# Patient Record
Sex: MALE | Race: AMERICAN INDIAN OR ALASKA NATIVE
[De-identification: names, ages, dates, MRNs, and addresses within clinical notes are randomized per-mention and may not be internally consistent; named-entity substitution may affect disease eponyms.]

---

## 2018-08-22 ENCOUNTER — HOSPITAL ENCOUNTER (OUTPATIENT)
Dept: HOSPITAL 5 - CATHLABREC | Age: 68
Discharge: HOME | End: 2018-08-22
Attending: RADIOLOGY
Payer: MEDICARE

## 2018-08-22 VITALS — DIASTOLIC BLOOD PRESSURE: 61 MMHG | SYSTOLIC BLOOD PRESSURE: 200 MMHG

## 2018-08-22 DIAGNOSIS — M06.9: ICD-10-CM
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
## 2018-08-22 DIAGNOSIS — Z98.42: ICD-10-CM

## 2018-08-22 DIAGNOSIS — T82.858A: Primary | ICD-10-CM

## 2018-08-22 DIAGNOSIS — Z87.891: ICD-10-CM

## 2018-08-22 DIAGNOSIS — E11.51: ICD-10-CM

## 2018-08-22 DIAGNOSIS — Z79.4: ICD-10-CM

## 2018-08-22 DIAGNOSIS — E11.42: ICD-10-CM

## 2018-08-22 DIAGNOSIS — Z79.82: ICD-10-CM

## 2018-08-22 DIAGNOSIS — I12.0: ICD-10-CM

## 2018-08-22 DIAGNOSIS — Z98.890: ICD-10-CM

## 2018-08-22 DIAGNOSIS — Z88.5: ICD-10-CM

## 2018-08-22 DIAGNOSIS — Z79.899: ICD-10-CM

## 2018-08-22 DIAGNOSIS — I25.2: ICD-10-CM

## 2018-08-22 DIAGNOSIS — E78.00: ICD-10-CM

## 2018-08-22 DIAGNOSIS — Z98.41: ICD-10-CM

## 2018-08-22 DIAGNOSIS — E11.22: ICD-10-CM

## 2018-08-22 DIAGNOSIS — N18.6: ICD-10-CM

## 2018-08-22 LAB
BASOPHILS # (AUTO): 0.1 K/MM3 (ref 0–0.1)
BASOPHILS NFR BLD AUTO: 0.7 % (ref 0–1.8)
BUN SERPL-MCNC: 22 MG/DL (ref 9–20)
BUN/CREAT SERPL: 6 %
CALCIUM SERPL-MCNC: 9.2 MG/DL (ref 8.4–10.2)
EOSINOPHIL # BLD AUTO: 0.3 K/MM3 (ref 0–0.4)
EOSINOPHIL NFR BLD AUTO: 3 % (ref 0–4.3)
HCT VFR BLD CALC: 33.7 % (ref 35.5–45.6)
HEMOLYSIS INDEX: 0
HGB BLD-MCNC: 11.4 GM/DL (ref 11.8–15.2)
LYMPHOCYTES # BLD AUTO: 1.6 K/MM3 (ref 1.2–5.4)
LYMPHOCYTES NFR BLD AUTO: 15.7 % (ref 13.4–35)
MCH RBC QN AUTO: 28 PG (ref 28–32)
MCHC RBC AUTO-ENTMCNC: 34 % (ref 32–34)
MCV RBC AUTO: 84 FL (ref 84–94)
MONOCYTES # (AUTO): 0.8 K/MM3 (ref 0–0.8)
MONOCYTES % (AUTO): 7.5 % (ref 0–7.3)
PLATELET # BLD: 171 K/MM3 (ref 140–440)
RBC # BLD AUTO: 4.03 M/MM3 (ref 3.65–5.03)

## 2018-08-22 PROCEDURE — C1769 GUIDE WIRE: HCPCS

## 2018-08-22 PROCEDURE — 85025 COMPLETE CBC W/AUTO DIFF WBC: CPT

## 2018-08-22 PROCEDURE — C1751 CATH, INF, PER/CENT/MIDLINE: HCPCS

## 2018-08-22 PROCEDURE — C1894 INTRO/SHEATH, NON-LASER: HCPCS

## 2018-08-22 PROCEDURE — 36901 INTRO CATH DIALYSIS CIRCUIT: CPT

## 2018-08-22 PROCEDURE — 36902 INTRO CATH DIALYSIS CIRCUIT: CPT

## 2018-08-22 PROCEDURE — 80048 BASIC METABOLIC PNL TOTAL CA: CPT

## 2018-08-22 PROCEDURE — C1725 CATH, TRANSLUMIN NON-LASER: HCPCS

## 2018-08-22 PROCEDURE — 36415 COLL VENOUS BLD VENIPUNCTURE: CPT

## 2018-08-22 PROCEDURE — 36907 BALO ANGIOP CTR DIALYSIS SEG: CPT

## 2018-08-22 NOTE — OPERATIVE REPORT
Operative Report


Operative Report: 





Exam: Left upper extremity fistulogram, venoplasty





Clinical indication: Patient with a history of dialysis access malfunctioning 

and decreased flow





Date: 08/22/2018





Procedure: Following an explanation of the risks, benefits and alternatives; 

written informed consent was obtained.  The patient was brought to the 

angiographic suite and placed in supine position on the examination table.  

Initial evaluation of his left Raina fistula demonstrated a palpable thrill.  

The patient's left lower arm was prepped and draped in the usual sterile 

fashion.  1% lidocaine was used for anesthesia.





Using ultrasound guidance, the fistula was cannulated towards the venous 

outflow using a 7 cm 21-gauge needle.  A 0.018 guidewire was advanced 

centrally.  The needle was removed and a micro-sheath placed.  The 0.018 

guidewire was exchanged for a 0.035 guidewire and the micro-sheath exchanged 

for a 6 [place] vascular sheath.





Angiography was performed to the sheath in the body of the fistula and upper 

arm.  Angiography was performed in the central veins using a 4 [place] vertebral 

catheter placed in the left subclavian vein over a guidewire.  This 

demonstrates 50% stenosis in the body of the fistula just distal to the 

pseudoaneurysm.  The remaining fistula in the forearm is widely patent.  Venous 

drainage in the upper arm bifurcates into the superficial and deep venous 

systems with brisk flow throughout.





Venoplasty of the stenosis was performed using a 10 mm x 40 mm balloon 

insufflated to 6 devorah for 30 seconds.  Post venoplasty imaging demonstrated 

reduction of the stenosis to less than 20%.





The catheters, guidewires and sheath was removed and hemostasis achieved using 4

-0 Vicryl suture and manual compression.  A sterile dressing was then applied.





The patient tolerated the procedure well.  There were no immediate post 

procedure complications.





Conscious sedation was performed under the guidance of radiologic nursing.  

Continuous cardiopulmonary monitoring was utilized.





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  2) Venoplasty with 

residual less than 10% stenosis.  3) Would recommend accessing the fistula 

higher as opposed to through the pseudoaneurysm.

## 2018-08-22 NOTE — SHORT STAY SUMMARY
Short Stay Documentation


Date of service: 08/22/18





- History


Principal diagnosis: ESRD on HD


H&P: obtained from office





- Allergies and Medications


Current Medications: 


 Allergies





codeine Adverse Reaction (Verified 08/22/18 08:39)


 Nausea





 Home Medications











 Medication  Instructions  Recorded  Confirmed  Last Taken  Type


 


Aspirin [Aspir-Low] 81 mg PO DAILY 08/22/18 08/22/18 08/21/18 History





    81mg 


 


Calcitriol [Rocaltrol] 0.2 mcg PO DAILY 08/22/18 08/22/18 08/21/18 History





    0.2mcg 


 


Carvedilol [Coreg] 25 mg PO BID 08/22/18 08/22/18 08/21/18 History





    25mg 


 


Clopidogrel Bisulfate [Plavix] 75 mg PO DAILY 08/22/18 08/22/18 08/21/18 History





    75mg 


 


Ergocalciferol [Vitamin D2] 1 cap PO 1XW 08/22/18 08/22/18 08/19/18 History





    1 


 


Felodipine [Felodipine ER] 10 mg PO DAILY 08/22/18 08/22/18 08/21/18 History





    10mg 


 


Furosemide [Lasix] 80 mg PO BID 08/22/18 08/22/18 08/21/18 History





    80mg 


 


Insulin Aspart [NovoLOG Flexpen] 21 units SC AC 08/22/18 08/22/18 08/21/18 

History





    21 units 


 


Insulin Glargine,Hum.rec.anlog 50 units SC DAILY 08/22/18 08/22/18 08/21/18 

History





[Lantus]    50 units 


 


Losartan [Cozaar] 100 mg PO DAILY 08/22/18 08/22/18 08/21/18 History





    100mg 


 


hydrALAZINE [Apresoline TAB] 100 mg PO BID 08/22/18 08/22/18 08/21/18 History





    100mg 








Active Medications





Cefazolin Sodium (Ancef/Sterile Water 2 Gm/20 Ml)  2 gm in 20 mls @ 80 mls/hr 

IV PREOP NR; Protocol


   Stop: 08/22/18 23:59


Sodium Chloride (Nacl 0.9% 1000 Ml)  1,000 mls @ 42 mls/hr IV AS DIRECT RODDY











- Brief post op/procedure progress note


Date of procedure: 08/22/18


Pre-op diagnosis: Dialysis access malfunction


Post-op diagnosis: same


Procedure: 





fistulagram with venoplasty





Anesthesia: local


Surgeon: LYNETTE SANTIAGO


Estimated blood loss: minimal


Pathology: none


Condition: stable





- Disposition


Condition at discharge: Good


Disposition: DC-01 TO HOME OR SELFCARE





Short Stay Discharge Plan


Activity: advance as tolerated


Weight Bearing Status: Weight Bear as Tolerated


Diet: renal


Wound: keep clean and dry, per your surgeon's advice


Follow up with: 


VAHE AC MD [Primary Care Provider] - 7 Days